# Patient Record
Sex: FEMALE | Race: OTHER | Employment: OTHER | URBAN - METROPOLITAN AREA
[De-identification: names, ages, dates, MRNs, and addresses within clinical notes are randomized per-mention and may not be internally consistent; named-entity substitution may affect disease eponyms.]

---

## 2019-05-14 ENCOUNTER — OFFICE VISIT (OUTPATIENT)
Dept: ORTHOPEDIC SURGERY | Age: 84
End: 2019-05-14
Payer: COMMERCIAL

## 2019-05-14 VITALS — WEIGHT: 229 LBS | SYSTOLIC BLOOD PRESSURE: 103 MMHG | DIASTOLIC BLOOD PRESSURE: 56 MMHG | HEART RATE: 90 BPM

## 2019-05-14 DIAGNOSIS — M25.512 LEFT SHOULDER PAIN, UNSPECIFIED CHRONICITY: ICD-10-CM

## 2019-05-14 DIAGNOSIS — G56.03 CARPAL TUNNEL SYNDROME ON BOTH SIDES: Primary | ICD-10-CM

## 2019-05-14 DIAGNOSIS — M25.511 RIGHT SHOULDER PAIN, UNSPECIFIED CHRONICITY: ICD-10-CM

## 2019-05-14 DIAGNOSIS — M25.531 BILATERAL WRIST PAIN: ICD-10-CM

## 2019-05-14 DIAGNOSIS — M25.532 BILATERAL WRIST PAIN: ICD-10-CM

## 2019-05-14 PROCEDURE — 1090F PRES/ABSN URINE INCON ASSESS: CPT | Performed by: ORTHOPAEDIC SURGERY

## 2019-05-14 PROCEDURE — 1036F TOBACCO NON-USER: CPT | Performed by: ORTHOPAEDIC SURGERY

## 2019-05-14 PROCEDURE — 20605 DRAIN/INJ JOINT/BURSA W/O US: CPT | Performed by: ORTHOPAEDIC SURGERY

## 2019-05-14 PROCEDURE — G8400 PT W/DXA NO RESULTS DOC: HCPCS | Performed by: ORTHOPAEDIC SURGERY

## 2019-05-14 PROCEDURE — 1123F ACP DISCUSS/DSCN MKR DOCD: CPT | Performed by: ORTHOPAEDIC SURGERY

## 2019-05-14 PROCEDURE — 99203 OFFICE O/P NEW LOW 30 MIN: CPT | Performed by: ORTHOPAEDIC SURGERY

## 2019-05-14 PROCEDURE — L3908 WHO COCK-UP NONMOLDE PRE OTS: HCPCS | Performed by: ORTHOPAEDIC SURGERY

## 2019-05-14 PROCEDURE — G8421 BMI NOT CALCULATED: HCPCS | Performed by: ORTHOPAEDIC SURGERY

## 2019-05-14 PROCEDURE — G8427 DOCREV CUR MEDS BY ELIG CLIN: HCPCS | Performed by: ORTHOPAEDIC SURGERY

## 2019-05-14 PROCEDURE — 4040F PNEUMOC VAC/ADMIN/RCVD: CPT | Performed by: ORTHOPAEDIC SURGERY

## 2019-05-14 RX ORDER — GABAPENTIN 100 MG/1
100 CAPSULE ORAL 3 TIMES DAILY
COMMUNITY
End: 2021-05-04 | Stop reason: ALTCHOICE

## 2019-05-14 RX ORDER — ATORVASTATIN CALCIUM 20 MG/1
20 TABLET, FILM COATED ORAL DAILY
COMMUNITY

## 2019-05-14 RX ORDER — MELOXICAM 15 MG/1
15 TABLET ORAL DAILY
COMMUNITY

## 2019-05-14 RX ORDER — LOSARTAN POTASSIUM 25 MG/1
25 TABLET ORAL DAILY
COMMUNITY
End: 2021-05-04 | Stop reason: ALTCHOICE

## 2019-05-14 SDOH — HEALTH STABILITY: MENTAL HEALTH: HOW OFTEN DO YOU HAVE A DRINK CONTAINING ALCOHOL?: NEVER

## 2019-05-14 NOTE — PROGRESS NOTES
Musculoskeletal ROS: positive for - joint pain, joint stiffness and numbness    Depomedrol     NDC#: 7095-1629-89  Lot: [id number]  Expiration Date: 1/2021  Dose: 1cc  Location: injection was given in Left  wrist      Lido    NDC#: 9824-6661-36  Lot: [id number]  Expiration Date: 8/20  Dose:1cc  Location: injection was given in Left wrist

## 2019-05-22 NOTE — PROGRESS NOTES
Chief Complaint    Shoulder Pain (NP right shoulder) and Hand Pain (bilat hand pain)      History of Present Illness:  Jeremy Nava is a 80 y.o. female Very pleasant Amharic only speaking who presents accompanied by her son for further evaluation and treatment  Of bilateral hand numbness and tingling. She is referred by her son whom I know personally. She has not had much intervention for this besides interval splinting. She  Previously saw Dr. Claudia Langley ordered bilateral EMGs. These studies were performed on April 29 and revealed severe bilateral carpal tunnel syndrome, bilaterally. Mild ulnar neuropathy was also noted. She is contemplating surgery but would like to avoid this if at all possible. She also wonders if this is somehow related to any shoulder problems. She is accompanied by her son. Medical History:    Patient's medications, allergies, past medical, surgical, social and family histories were reviewed and updated as appropriate. Past Medical History:   Diagnosis Date    Diabetes ([facility] [address]     Neuropathy     Osteoarthritis       Social History     Socioeconomic History    Marital status:       Spouse name: Not on file    Number of children: Not on file    Years of education: Not on file    Highest education level: Not on file   Occupational History    Not on file   Social Needs    Financial resource strain: Not on file    Food insecurity:     Worry: Not on file     Inability: Not on file    Transportation needs:     Medical: Not on file     Non-medical: Not on file   Tobacco Use    Smoking status: Never Smoker    Smokeless tobacco: Never Used   Substance and Sexual Activity    Alcohol use: Never     Frequency: Never    Drug use: Never    Sexual activity: Not on file   Lifestyle    Physical activity:     Days per week: Not on file     Minutes per session: Not on file    Stress: Not on file   Relationships    Social connections:     Talks on phone: Not on file Gets together: Not on file     Attends Muslim service: Not on file     Active member of club or organization: Not on file     Attends meetings of clubs or organizations: Not on file     Relationship status: Not on file    Intimate partner violence:     Fear of current or ex partner: Not on file     Emotionally abused: Not on file     Physically abused: Not on file     Forced sexual activity: Not on file   Other Topics Concern    Not on file   Social History Narrative    Not on file       No Known Allergies  Current Outpatient Medications on File Prior to Visit   Medication Sig Dispense Refill    metFORMIN (GLUCOPHAGE) 850 MG tablet Take 850 mg by mouth 2 times daily (with meals)      losartan (COZAAR) 25 MG tablet Take 25 mg by mouth daily      meloxicam (MOBIC) 15 MG tablet Take 15 mg by mouth daily      aspirin 81 MG tablet Take 81 mg by mouth daily      atorvastatin (LIPITOR) 20 MG tablet Take 20 mg by mouth daily      gabapentin (NEURONTIN) 100 MG capsule Take 100 mg by mouth 3 times daily.  insulin aspart (NOVOLOG FLEXPEN) 100 UNIT/ML injection pen Inject into the skin 3 times daily (before meals)      ALENDRONATE SODIUM PO Take by mouth       No current facility-administered medications on file prior to visit. Review of Systems:  Pertinent items are noted in HPI  Review of systems reviewed from Patient History Form dated on Today May 14, 2019 and available in the patient's chart under the Media tab. Vital Signs:  Vitals:    05/14/19 1508   BP: (!) 103/56   Pulse: 90       General Exam:   Constitutional: Patient is adequately groomed with no evidence of malnutrition  Mental Status: The patient is oriented to time, place and person. The patient's mood and affect are appropriate. Neurological: The patient has good coordination. There is no weakness or sensory deficit. She appears younger than her stated age and is in no apparent distress.     Bilateral wrist And hand Examination:    Inspection:  No obvious thenar or hyperthenar atrophy, bilaterally. No deformity. Palpation:  Pain with Tinel's at the carpal canal.  Mild positive Tinel at the cubital tunnel bilaterally. No crepitation with gentle finger flexion and extension or wrist motion. Active Range of Motion: Slight deficits in wrist extension and flexion bilaterally. Otherwise preserved range of motion both passively and actively. Passive Range of Motion:  No obvious deficits. Strength:  Hand intrinsic strength 4 over 5. Abduction strength 4 over 5.  strength 4 minus over 5, bilaterally equal.    Special Tests:  Mildly positive median nerve compression test. Negative Finkelstein's test.  No obvious triggering of any of the fingers. Bilateral shoulder examination:    Well-preserved range of motion. No obvious atrophy. No crepitus. Slight tenderness over the rotator cuff. Rotator cuff strength 4 over 5. Deltoid strength 5 over 5. Impingement tests were essentially negative today. Radiology:     Plain radiographs of the Right and left shoulders comprising 3 views each: True AP and internal and external rotation and axillary lateral, were obtained and reviewed in the office today. These are unremarkable except for slight osteopenia and nonspecific a.c. Joint changes. No advanced Glenohumeral osteoarthritis. Assessment :  My impression is that Tracey has bilateral carpal tunnel syndrome. A trial of conservative treatment is most appropriate. Impression:  Encounter Diagnoses   Name Primary?     Right shoulder pain, unspecified chronicity     Bilateral wrist pain     Left shoulder pain, unspecified chronicity     Carpal tunnel syndrome on both sides Yes       Office Procedures:  Orders Placed This Encounter   Procedures    XR SHOULDER RIGHT (MIN 2 VIEWS)     Standing Status:   Future     Number of Occurrences:   1     Standing Expiration Date:   5/14/2020     Order Specific Question: Reason for exam:     Answer:   pain    XR SHOULDER LEFT (MIN 2 VIEWS)     Standing Status:   Future     Number of Occurrences:   1     Standing Expiration Date:   5/14/2020    NM METHYLPREDNISOLONE 40 MG INJ    NM ARTHROCENTESIS ASPIR&/INJ INTERM JT/BURS W/O US    DJO Quick Fit Wrist     Patient was prescribed a DJO Quick Fit Wrist brace. The right wrist will require stabilization / immobilization from this semi-rigid / rigid orthosis to improve their function. The orthosis will assist in protecting the affected area, provide functional support and facilitate healing. The patient was educated and fit by a healthcare professional with expert knowledge and specialization in brace application while under the direct supervision of the treating physician. Verbal and written instructions for the use of and application of this item were provided. They were instructed to contact the office immediately should the brace result in increased pain, decreased sensation, increased swelling or worsening of the condition. Treatment Plan:  We discussed conservative treatment. I offered her corticosteroid injections but she was reluctant at first. Still she eventually agreed to a single injection for the more symptomatic left wrist.      Risks and benefits of a corticosteroid injection were discussed with Nostrat. 40 milligrams of Depo Medrol and 1 CC of 1% lidocaine were injected in the left Wrist following chlorhexidine prep. She  tolerated the procedure well with no immediate adverse sequelae after the injection. We also provided cockup wrist splints to wear bilaterally. We'll see her again for follow-up in a month. If her left wrist is doing well then we can contemplate a similar injection for the right wrist.  She is in agreement with this plan, and all questions were answered today. Chano Novak MD, PhD  5/14/2019

## 2019-06-12 ENCOUNTER — OFFICE VISIT (OUTPATIENT)
Dept: ORTHOPEDIC SURGERY | Age: 84
End: 2019-06-12
Payer: COMMERCIAL

## 2019-06-12 VITALS — WEIGHT: 229 LBS | BODY MASS INDEX: 39.09 KG/M2 | HEIGHT: 64 IN

## 2019-06-12 DIAGNOSIS — G56.03 CARPAL TUNNEL SYNDROME ON BOTH SIDES: ICD-10-CM

## 2019-06-12 DIAGNOSIS — M12.811 ROTATOR CUFF ARTHROPATHY OF RIGHT SHOULDER: Primary | ICD-10-CM

## 2019-06-12 PROCEDURE — 4040F PNEUMOC VAC/ADMIN/RCVD: CPT | Performed by: ORTHOPAEDIC SURGERY

## 2019-06-12 PROCEDURE — 99213 OFFICE O/P EST LOW 20 MIN: CPT | Performed by: ORTHOPAEDIC SURGERY

## 2019-06-12 PROCEDURE — 20610 DRAIN/INJ JOINT/BURSA W/O US: CPT | Performed by: ORTHOPAEDIC SURGERY

## 2019-06-12 PROCEDURE — 1123F ACP DISCUSS/DSCN MKR DOCD: CPT | Performed by: ORTHOPAEDIC SURGERY

## 2019-06-12 PROCEDURE — L3908 WHO COCK-UP NONMOLDE PRE OTS: HCPCS | Performed by: ORTHOPAEDIC SURGERY

## 2019-06-12 PROCEDURE — G8417 CALC BMI ABV UP PARAM F/U: HCPCS | Performed by: ORTHOPAEDIC SURGERY

## 2019-06-12 PROCEDURE — 1036F TOBACCO NON-USER: CPT | Performed by: ORTHOPAEDIC SURGERY

## 2019-06-12 PROCEDURE — G8427 DOCREV CUR MEDS BY ELIG CLIN: HCPCS | Performed by: ORTHOPAEDIC SURGERY

## 2019-06-12 PROCEDURE — G8400 PT W/DXA NO RESULTS DOC: HCPCS | Performed by: ORTHOPAEDIC SURGERY

## 2019-06-12 PROCEDURE — 1090F PRES/ABSN URINE INCON ASSESS: CPT | Performed by: ORTHOPAEDIC SURGERY

## 2019-06-12 NOTE — LETTER
Shoulder Elbow Rehabilitation Referral    Patient Name: Geovanna Guevara      YOB: 1935    Diagnosis:   1. Carpal tunnel syndrome on both sides    2. Rotator cuff arthropathy of right shoulder          Precautions: Corticosteroid injection on 6/12/2019      Post Op Instructions:  [] Continuous passive motion (CPM)  [] Elbow range of motion  [] Exercise in plane of scapula   []  Strengthening     [] Pulley and instruction    [x] Home exercise program (copy to patient)   [] Sling when arm at risk  [] Sling or brace at all times   [x] AAROM: Forward elevation to Full            [x] AAROM: External rotation to Full    [] Isometric external rotator strengthening [x] AAROM: internal rotation: up the back  [x] Isometric abductor strengthening  [x] AAROM: Internal abduction     [] Isometric internal rotator strengthening [x] AAROM: cross-body adduction             Stretching:     Strengthening:  [x] Four quadrant (FE, ER, IR, CBA)  [x] Rotator cuff (ER, IR, Abd)  [x] Forward Elevation    [x] External Rotators     [x] External Rotation    [x] Internal Rotators  [x] Internal Rotation: up/back   [x] Abductors     [x] Internal Rotation: supine in abduction  [x] Flexors  [x] Cross-body abduction    [x] Extensors  [] Pendulum (FE, Abd/Add, cw/ccw)  [x] Scapular Stabilizers   [] Wall-walking (FE, Abd)    [x] Shoulder shrugs     [] Table slides      [x] Rhomboid pinch  [] Elbow (flex, ext, pron, sup)    [] Lat.  Pull downs     [] Medial epicondylitis program    [] Forward punch   [] Lateral epicondylitis program    [] Internal rotators     [] Progressive resistive exercises  [] Bench Press        [] Bench press plus  Activities:     [] Lateral pull-downs  [] Rowing     [x] Progressive two-hand supine press  [] Stepper/Exercise bike   [x] Biceps/tricep: curls/supination  [] Swimming  [] Water exercises    Modalities: PRN    Return to Sport:  [] Ultrasound     [] Plyometrics

## 2019-06-12 NOTE — LETTER
[facility] [address] [facility] [place] [address]  Phone: [phone]  Fax: [phone]    Alisha Frazier MD        June 12, 2019     Patient: Arya Olson   YOB: 1935   Date of Visit: 6/12/2019       To Whom It May Concern: It is my medical opinion that Tracey Mccracken received a cortisone injection in her shoulder and it would be reasonable to consider a repeat injection after 12 weeks. If you have any questions or concerns, please don't hesitate to call.     Sincerely,          Alisha Frazier MD

## 2019-06-16 NOTE — PROGRESS NOTES
History of Present Illness:  Tracey Mccracken Returns for follow-up of her wrists and also for a new right shoulder problem. At her last visit I diagnosed bilateral carpal tunnel syndrome. He gave her a corticosteroid injection for the more symptomatic left wrist.  She reports that the corticosteroid injection did not help. She has not tried bracing. She is accompanied by her daughter-in-law. Meanwhile she also wanted me to examine her right shoulder. She's had chronic right shoulder pain and weakness. She has pain when laying on it. It bothers her with activities of daily living and around the house. Medical History:  Patient's medications, allergies, past medical, surgical, social and family histories were reviewed and updated as appropriate. Pertinent items are noted in HPI  Review of systems reviewed from Patient History Form dated on May 14, 2019 and available in the patient's chart under the Media tab. Vital Signs:  Vitals:     Constitutional: She is a healthy woman who appears younger than her stated age of 80. Right Shoulder Examination:    Inspection:  No deformity or obvious atrophy. Palpation:  Tenderness over the rotator cuff. They tenderness over the a.c. Joint and posterior joint line. Active Range of Motion: Active elevation to 90°. Internal rotation to the back pocket. Passive Range of Motion:  Passive elevation to 140° but with a painful arc. Strength:  Negative Hornblower's. Negative external rotation lag sign. However her strength is no more than 3 out of 5 on rotator cuff strength testing. Deltoid strength 4 out of 5. Special Tests:  Impingement tests are positive. Bilateral wrist examination:    No atrophy of the thenar or hyperthenar eminences. Decreased sensation over the median distribution bilaterally. No sequelae from the injection into the left carpal canal.      Radiology:     Previous x-rays from May 14 Were reviewed.   She has bilateral rotator cuff arthropathy. Assessment :  Right shoulder rotator cuff arthropathy and bilateral carpal tunnel syndrome. Impression:  Encounter Diagnoses   Name Primary?  Carpal tunnel syndrome on both sides     Rotator cuff arthropathy of right shoulder Yes       Office Procedures:  Orders Placed This Encounter   Procedures    NH METHYLPREDNISOLONE 40 MG INJ    NH ARTHROCENTESIS ASPIR&/INJ MAJOR JT/BURSA W/O US    DJO Quick Fit Wrist and Forearm     Patient was prescribed a DJO Quick Fit Wrist and Forearm Brace. The left hand will require stabilization / immobilization from this semi-rigid / rigid orthosis to improve their function. The orthosis will assist in protecting the affected area, provide functional support and facilitate healing. The patient was educated and fit by a healthcare professional with expert knowledge and specialization in brace application while under the direct supervision of the physician. Verbal and written instructions for the use of and application of this item were provided. They were instructed to contact the office immediately should the brace result in increased pain, decreased sensation, increased swelling or worsening of the condition. Treatment Plan:  I offered her a corticosteroid injection into the right Shoulder. Despite her poor response to the injection on the left side I do believe it is worth a try. Her options are otherwise limited. I also wrote a note so that if she returns back to [place] later this summer that she can follow-up with an orthopedic surgeon locally. Finally we will fit her with bilateral cockup wrist splints as he should be helpful. She should wear them primarily at night. Risks and benefits of a corticosteroid injection were discussed with Nostrat. 80 milligrams of Depo Medrol and 8 CC of 1% lidocaine were injected in the right shoulder glenohumeral joint following chlorhexidine prep.  She  tolerated the procedure well with no immediate adverse sequelae after the injection. Chano Bustamante MD, PhD  6/12/2019

## 2021-05-04 ENCOUNTER — OFFICE VISIT (OUTPATIENT)
Dept: ORTHOPEDIC SURGERY | Age: 86
End: 2021-05-04
Payer: COMMERCIAL

## 2021-05-04 VITALS — BODY MASS INDEX: 37.56 KG/M2 | WEIGHT: 220 LBS | HEIGHT: 64 IN

## 2021-05-04 DIAGNOSIS — M12.812 ROTATOR CUFF ARTHROPATHY, LEFT: ICD-10-CM

## 2021-05-04 DIAGNOSIS — M12.811 ROTATOR CUFF ARTHROPATHY, RIGHT: ICD-10-CM

## 2021-05-04 DIAGNOSIS — M25.512 LEFT SHOULDER PAIN, UNSPECIFIED CHRONICITY: ICD-10-CM

## 2021-05-04 DIAGNOSIS — M25.511 RIGHT SHOULDER PAIN, UNSPECIFIED CHRONICITY: Primary | ICD-10-CM

## 2021-05-04 PROCEDURE — 20610 DRAIN/INJ JOINT/BURSA W/O US: CPT | Performed by: ORTHOPAEDIC SURGERY

## 2021-05-04 PROCEDURE — G8428 CUR MEDS NOT DOCUMENT: HCPCS | Performed by: ORTHOPAEDIC SURGERY

## 2021-05-04 PROCEDURE — 1036F TOBACCO NON-USER: CPT | Performed by: ORTHOPAEDIC SURGERY

## 2021-05-04 PROCEDURE — 4040F PNEUMOC VAC/ADMIN/RCVD: CPT | Performed by: ORTHOPAEDIC SURGERY

## 2021-05-04 PROCEDURE — 1123F ACP DISCUSS/DSCN MKR DOCD: CPT | Performed by: ORTHOPAEDIC SURGERY

## 2021-05-04 PROCEDURE — 1090F PRES/ABSN URINE INCON ASSESS: CPT | Performed by: ORTHOPAEDIC SURGERY

## 2021-05-04 PROCEDURE — 99213 OFFICE O/P EST LOW 20 MIN: CPT | Performed by: ORTHOPAEDIC SURGERY

## 2021-05-04 PROCEDURE — G8400 PT W/DXA NO RESULTS DOC: HCPCS | Performed by: ORTHOPAEDIC SURGERY

## 2021-05-04 PROCEDURE — G8417 CALC BMI ABV UP PARAM F/U: HCPCS | Performed by: ORTHOPAEDIC SURGERY

## 2021-05-04 RX ORDER — METHYLPREDNISOLONE ACETATE 40 MG/ML
80 INJECTION, SUSPENSION INTRA-ARTICULAR; INTRALESIONAL; INTRAMUSCULAR; SOFT TISSUE ONCE
Status: COMPLETED | OUTPATIENT
Start: 2021-05-04 | End: 2021-05-04

## 2021-05-04 RX ORDER — LIDOCAINE HYDROCHLORIDE 10 MG/ML
8 INJECTION, SOLUTION INFILTRATION; PERINEURAL ONCE
Status: COMPLETED | OUTPATIENT
Start: 2021-05-04 | End: 2021-05-04

## 2021-05-04 RX ADMIN — METHYLPREDNISOLONE ACETATE 80 MG: 40 INJECTION, SUSPENSION INTRA-ARTICULAR; INTRALESIONAL; INTRAMUSCULAR; SOFT TISSUE at 12:49

## 2021-05-04 RX ADMIN — LIDOCAINE HYDROCHLORIDE 8 ML: 10 INJECTION, SOLUTION INFILTRATION; PERINEURAL at 12:47

## 2021-05-04 NOTE — PROGRESS NOTES
[address] [facility]  Office Visit  New Patient  Date:  2021    Name:  Ayush Beach  Address:  [address] [place] [address]    :  1935      Age:   80 y.o.    SSN:  [id number]      Medical Record Number:  <[id number]>    Chief Complaint:    Bilateral shoulder pain    HPI:   Ayush Beach is a 80 y.o. female who presents for evaluation of bilateral shoulders. We have seen her in the past 2 years ago for rotator cuff arthropathy. We gave her an injection in her right side she states that she had approximately 6 months of very good relief. She does still have limited function. She is here today with a family member who is translating for her. She does state that she does have poor function and is not able to do things like cooking, dressing and hygiene by herself. She is a diabetic and does have an elevated hemoglobin A1c of 8. She is not interesting in any surgical solution for the right shoulder. Pain Assessment  Location of Pain: Shoulder  Location Modifiers: Left, Right  Severity of Pain: 7  Quality of Pain: Dull, Aching  Duration of Pain: Persistent  Frequency of Pain: Intermittent  Aggravating Factors: (general use)  Limiting Behavior: Yes  Relieving Factors: Rest  Work-Related Injury: No  Are there other pain locations you wish to document?: No    Past History:  Past Medical History:   Diagnosis Date    Diabetes ([address]     High blood pressure     Neuropathy     Osteoarthritis        Past Surgical History:   Procedure Laterality Date    JOINT REPLACEMENT Bilateral     TKR    KNEE SURGERY         Social History     Tobacco Use    Smoking status: Never Smoker    Smokeless tobacco: Never Used   Substance Use Topics    Alcohol use: Never     Frequency: Never    Drug use: Never        Family History:  family history is not on file.          Current Outpatient Medications:     meloxicam (MOBIC) 15 MG tablet, Take 15 mg by mouth daily, Disp: , Rfl:     aspirin 81 MG tablet, Take 81 mg by mouth daily, Disp: , Rfl:     atorvastatin (LIPITOR) 20 MG tablet, Take 20 mg by mouth daily, Disp: , Rfl:     insulin aspart (NOVOLOG FLEXPEN) 100 UNIT/ML injection pen, Inject into the skin 3 times daily (before meals), Disp: , Rfl:     ALENDRONATE SODIUM PO, Take by mouth, Disp: , Rfl:       No Known Allergies      Review of Systems: A 14 point review of systems available in the scanned medical record as documented by the patient on 5/4/2021. Today's review pertinent items are noted in HPI. Review of Systems   Cardiovascular:        High blood pressure     Endocrine:        Diabetic     Musculoskeletal:        Joint pain - shoulder   Neurological:        Neuropathy             Physical Exam:  Ht 5' 4\" (1.626 m)   Wt 220 lb (99.8 kg)   BMI 37.76 kg/m²     General: No acute distress, well nourished  CV: No obvious peripheral edema. Normal peripheral pulses  Neuro: Alert & oriented x 3  Psych: Normal mood and affect    Right shoulder exam    No obvious deformities. Forward elevation 120 degrees, external rotation 10 degrees, internal rotation to sacrum. 4/5 strength with abduction, external rotation, belly press. No stability nervous intact distally    Left shoulder exam  No obvious deformities. Forward elevation 120 degrees, external rotation 10 degrees, internal rotation to sacrum. 4/5 strength with abduction, external rotation, belly press. No stability nervous intact distally    Radiographic:  X-rays obtained and reviewed in office, reviewed and interpreted by me today:  Views: 6  Location: Bilateral shoulders  Impression: Radiographs demonstrates decreased acromiohumeral intervals, glenohumeral joint arthritis and bilateral shoulders. Mild high riding humeral head. Radiographs consistent with grade 3 rotator cuff arthropathy. Assessment:  Carroll Mccoy is a 80 y.o. female with:  1.  Rotator cuff arthropathy, bilateral Impression:  Encounter Diagnoses   Name Primary?  Right shoulder pain, unspecified chronicity Yes    Left shoulder pain, unspecified chronicity        Office Procedures:  Orders Placed This Encounter   Procedures    XR SHOULDER RIGHT (MIN 2 VIEWS)     Standing Status:   Future     Number of Occurrences:   1     Standing Expiration Date:   5/4/2022    XR SHOULDER LEFT (MIN 2 VIEWS)     Standing Status:   Future     Number of Occurrences:   1     Standing Expiration Date:   5/4/2022     Order Specific Question:   Reason for exam:     Answer:   pain       Plan:   We discussed the diagnosis treatment options. We did discuss that to improve both pain and function she would be a candidate for reverse total shoulder arthroplasty. She is very against this at this time and does not want to undergo any type of big surgery. She is requesting an injection. We did discuss that due to her diabetes that she may have an elevation of her blood sugars. She expressed understanding of this. Due to her diabetes we prefer to only inject 1 shoulder at this visit and if she does get relief we will have her return in 2 weeks to have her other shoulder injected. We will start with the left today. We did discuss that she needs to check her blood sugars 2-3 times a day for the next 2 days. After discussing the risks and benefits of a corticosteroid injection, Nosrat did state an understanding and gave verbal consent to proceed. After cleansing the injection site with Chlora-prep and using aseptic techniques,  2 CC of Depo Medrol 40mg/ml and 8 CC of 1% lidocaine were injected in the left subacromial space. She  tolerated the procedure well with no immediate adverse sequelae after the injection. A bandage was placed over the injection site. Appropriate post injections instructions were given to the patient.       Beck Amaya DO  [facility] Fellow    The encounter with Carroll Mccoy was supervised by Dr. Jany Branham who personally examined the patient and reviewed the plan. This dictation was performed with a verbal recognition program (DRAGON) and it was checked for errors. It is possible that there are still dictated errors within this office note. If so, please bring any errors to my attention for an addendum. All efforts were made to ensure that this office note is accurate.   _______________  I was physically present and personally supervised the Orthopaedic Sports Medicine Fellow in the evaluation and development of a treatment plan for this patient. I personally interviewed the patient and performed a physical examination. In addition, I discussed the patient's condition and treatment options with them. I have also reviewed and agree with the past medical, family and social history unless otherwise noted. All of the patient's questions were answered. Chano Castro MD, PhD  5/4/2021

## 2021-05-19 ENCOUNTER — OFFICE VISIT (OUTPATIENT)
Dept: ORTHOPEDIC SURGERY | Age: 86
End: 2021-05-19
Payer: COMMERCIAL

## 2021-05-19 VITALS — BODY MASS INDEX: 37.56 KG/M2 | WEIGHT: 220.02 LBS | HEIGHT: 64 IN

## 2021-05-19 DIAGNOSIS — M12.811 ROTATOR CUFF ARTHROPATHY, RIGHT: Primary | ICD-10-CM

## 2021-05-19 DIAGNOSIS — M12.812 ROTATOR CUFF ARTHROPATHY, LEFT: ICD-10-CM

## 2021-05-19 PROCEDURE — G8427 DOCREV CUR MEDS BY ELIG CLIN: HCPCS | Performed by: ORTHOPAEDIC SURGERY

## 2021-05-19 PROCEDURE — 20610 DRAIN/INJ JOINT/BURSA W/O US: CPT | Performed by: ORTHOPAEDIC SURGERY

## 2021-05-19 PROCEDURE — 4040F PNEUMOC VAC/ADMIN/RCVD: CPT | Performed by: ORTHOPAEDIC SURGERY

## 2021-05-19 PROCEDURE — 1090F PRES/ABSN URINE INCON ASSESS: CPT | Performed by: ORTHOPAEDIC SURGERY

## 2021-05-19 PROCEDURE — G8400 PT W/DXA NO RESULTS DOC: HCPCS | Performed by: ORTHOPAEDIC SURGERY

## 2021-05-19 PROCEDURE — G8417 CALC BMI ABV UP PARAM F/U: HCPCS | Performed by: ORTHOPAEDIC SURGERY

## 2021-05-19 PROCEDURE — 1036F TOBACCO NON-USER: CPT | Performed by: ORTHOPAEDIC SURGERY

## 2021-05-19 PROCEDURE — 1123F ACP DISCUSS/DSCN MKR DOCD: CPT | Performed by: ORTHOPAEDIC SURGERY

## 2021-05-19 NOTE — PROGRESS NOTES
Chief Complaint    Follow-up (right shoulder )      History of Present Illness:  Olivier Mccracken is a pleasant, 80 y.o., female, here today for follow up of her bilateral shoulders. She has bilateral cuff arthropathy. She is aware she is heading towards a reverse total shoulder replacement, but she is not ready for an operation at this time. We did administer an intra-articular corticosteroid injection on 5/4/21 for the left shoulder - she is hoping for a right shoulder injection today. Overall she has noticed a significant improvement in symptoms. She feels the injection did provide meaningful relief from symptoms. Her motion has improved as well. She reports no new injuries or setbacks. Pain Assessment  Location of Pain: Shoulder  Location Modifiers: Right  Severity of Pain: 7  Quality of Pain: Aching  Duration of Pain: Persistent  Frequency of Pain: Constant  Aggravating Factors: Other (Comment) (movement)  Relieving Factors: Rest  Result of Injury: No  Work-Related Injury: No  Are there other pain locations you wish to document?: No      Medical History:  Patient's medications, allergies, past medical, surgical, social and family histories were reviewed and updated as appropriate. Review of Systems  A 14 point review of systems was completed by the patient on 5/4/21 and is available in the media section of the scanned medical record and was reviewed on 5/19/2021. The review is negative with the exception of those things mentioned in the HPI and Past Medical History    Vital Signs:  Vitals:       General/Appearance: Alert and oriented and in no apparent distress. Skin:  There are no skin lesions, cellulitis, or extreme edema. The patient has warm and well-perfused Bilateral upper extremities with brisk capillary refill. Right Shoulder Exam:  Inspection: No gross deformities, no signs of infection. Palpation: Joint line tenderness bilaterally    Active Range of Motion:   Forward Elevation 140 on the left vs 120 on the right     Passive Range of Motion: Deferred    Strength: Deferred    Special Tests: No Rod muscle deformity. Neurovascular: Sensation to light touch is intact, no motor deficits, palpable radial pulses 2+      Radiology:     No new XR obtained at this time. Assessment :  Ms. Rafael Perez is a pleasant, 80 y.o. patient with bilateral rotator cuff arthropathy. Impression:  Encounter Diagnoses   Name Primary?  Rotator cuff arthropathy, left Yes    Rotator cuff arthropathy, right        Office Procedures:  Orders Placed This Encounter   Procedures    [place] ARTHROCENTESIS ASPIR&/INJ MAJOR JT/BURSA W/O US     80 mg Depomedrol with 8 cc 1% Lidocaine in 10cc syringe with 25G (22G) needle     After discussing the risks and benefits of a corticosteroid injection, Olivier did state an understanding and gave verbal consent to proceed. After cleansing the injection site with Chlora-prep and using aseptic techniques,  2 CC of Depo Medrol 40mg/ml and 8 CC of 1% lidocaine were injected in the right subacromial space. She  tolerated the procedure well with no immediate adverse sequelae after the injection. A bandage was placed over the injection site. Appropriate post injections instructions were given to the patient. Treatment Plan:  We do feel she will benefit from a right shoulder injection today as the left side did provide significant relief from symptoms. Again, we will have her monitor her blood sugar closely following this injection. Her blood sugars beau to 400 after the most recent injection, but stabilized after a few days. We explained we may continue to utilize injections as a treatment option as long as they provide relief from symptoms. We can see her back prior to her upcoming travel for repeat injections if needed. We will see Olivier back in 3 months and/or as needed.  All questions were answered to patient's satisfaction and She was encouraged to call with any further questions or concerns. Jose Ramon Villanueva is in agreement with this plan. 5/19/2021  4:50 PM    Veena Diane ATC  [facility] [address] [employer]    During this examination, I, Williamsry President, functioned as a scribe for Dr. Lakisha Ellsworth. The history taking and physical examination were performed by Dr. Joan Luna. All counseling during the appointment was performed between the patient and Dr. Joan Luna. 5/19/21   __________________  I, Dr. Lakisha Ellsworth, personally performed the services described in this documentation as described by Veena Diane ATC in my presence, and it is both accurate and complete. Sameaylin Luna MD, PhD  5/19/2021

## 2021-07-07 ENCOUNTER — OFFICE VISIT (OUTPATIENT)
Dept: ORTHOPEDIC SURGERY | Age: 86
End: 2021-07-07
Payer: COMMERCIAL

## 2021-07-07 VITALS — BODY MASS INDEX: 37.56 KG/M2 | HEIGHT: 64 IN | WEIGHT: 220.02 LBS

## 2021-07-07 DIAGNOSIS — M12.812 ROTATOR CUFF ARTHROPATHY, LEFT: Primary | ICD-10-CM

## 2021-07-07 DIAGNOSIS — M25.512 LEFT SHOULDER PAIN, UNSPECIFIED CHRONICITY: ICD-10-CM

## 2021-07-07 DIAGNOSIS — M12.811 ROTATOR CUFF ARTHROPATHY, RIGHT: ICD-10-CM

## 2021-07-07 PROCEDURE — 1123F ACP DISCUSS/DSCN MKR DOCD: CPT | Performed by: ORTHOPAEDIC SURGERY

## 2021-07-07 PROCEDURE — 1090F PRES/ABSN URINE INCON ASSESS: CPT | Performed by: ORTHOPAEDIC SURGERY

## 2021-07-07 PROCEDURE — G8400 PT W/DXA NO RESULTS DOC: HCPCS | Performed by: ORTHOPAEDIC SURGERY

## 2021-07-07 PROCEDURE — 1036F TOBACCO NON-USER: CPT | Performed by: ORTHOPAEDIC SURGERY

## 2021-07-07 PROCEDURE — 20610 DRAIN/INJ JOINT/BURSA W/O US: CPT | Performed by: ORTHOPAEDIC SURGERY

## 2021-07-07 PROCEDURE — 99213 OFFICE O/P EST LOW 20 MIN: CPT | Performed by: ORTHOPAEDIC SURGERY

## 2021-07-07 PROCEDURE — G8427 DOCREV CUR MEDS BY ELIG CLIN: HCPCS | Performed by: ORTHOPAEDIC SURGERY

## 2021-07-07 PROCEDURE — G8417 CALC BMI ABV UP PARAM F/U: HCPCS | Performed by: ORTHOPAEDIC SURGERY

## 2021-07-07 PROCEDURE — 4040F PNEUMOC VAC/ADMIN/RCVD: CPT | Performed by: ORTHOPAEDIC SURGERY

## 2021-07-07 NOTE — PROGRESS NOTES
[facility]  History and Physical  Shoulder Pain    Date:  2021    Name:  Eva Gonzales  Address:  [address] [place] [address]    :  1935      Age:   80 y.o.    SSN:  [id number]      Medical Record Number:  <[id number]>    Reason for Visit:    Follow-up (right shoulder)      HPI:   Eva Gonzales is a 80 y.o. female with a history of type 2 diabetes presents to our office today for follow up of the bilateral shoulder pain. Patient has bilateral shoulder rotator cuff arthropathy. She reports the left side is worse than the right side. She has been receiving injections and overall feels that they are having diminishing returns. She is still hesitant to consider surgery at this time. She ould like to know if she can have repeat steroid injections done today. She denies any new injuries. She is accompanied by her daughter-in-law today. Pain Assessment  Location of Pain: Shoulder  Location Modifiers: Right  Severity of Pain: 4  Quality of Pain: Aching, Dull  Duration of Pain: Persistent  Frequency of Pain: Constant  Aggravating Factors: Other (Comment) (n/a)  Relieving Factors: Rest  Result of Injury: No  Work-Related Injury: No  Are there other pain locations you wish to document?: No    Review of Systems    Done: 21  Patient has had no medical changes since last evaluated          Review of Systems:  A 14 point review of systems available in the scanned medical record as documented by the patient on 2021. The review is negative with the exception of those things mentioned in the History of Present Illness and Past Medical History. Past Medical History:  Patient's medications, allergies, past medical, surgical, social and family histories were reviewed and updated as appropriate.     Allergies:  No Known Allergies    Physical Exam:  Vitals:     General: Eva Gonzales is a healthy and well appearing 80 y.o. female who is sitting comfortably in our office in acute distress. Skin:  There are no skin lesions, cellulitis, or extreme edema. The patient has warm and well-perfused Bilateral upper extremities with brisk capillary refill. Eyes: Extra-ocular muscles intact  Mouth: Oral mucosa moist. No perioral lesions  Pulm: Respirations unlabored and regular. Neuro: Alert and oriented x3    Bilateral shoulder Exam:  Inspection:  No gross deformities, no signs of infection. Palpation: Has tenderness over the rotator cuff footprint and joint line. She has subacromial and glenohumeral crepitus present. Active Range of Motion: Forward elevation of 140, external rotation with elbow at the side 0, internal rotation to the back is back pocket bilaterally    Passive Range of Motion: Deferred    Strength: Deferred    Special Tests:  No Rod muscle deformity. Neurovascular: Sensation to light touch is intact, no motor deficits, palpable radial pulses 2+    Additional Examinations:    Examination of the contralateral extremity does not show any tenderness, deformity or injury. Range of motion is unremarkable. There is no gross instability. There are no rashes, ulcerations or lesions. Strength and tone are normal.      Radiographic:  Old x-rays reviewed today   Assessment:  Mallory Corrigan is a 80 y.o. female with bilateral shoulder pain related to rotator cuff arthropathy. .    Impression:  Encounter Diagnoses   Name Primary?  Rotator cuff arthropathy, left Yes    Rotator cuff arthropathy, right     Left shoulder pain, unspecified chronicity        Office Procedures:  Orders Placed This Encounter   Procedures    [place] ARTHROCENTESIS ASPIR&/INJ MAJOR JT/BURSA W/O US     80 mg Depomedrol with 8 cc 1% Lidocaine in 10cc syringe with 25G (22G) needle         After discussing the risks and benefits of a corticosteroid injection, Nosrat did state an understanding and gave verbal consent to proceed.   After cleansing the injection site with Chlora-prep and using aseptic techniques,  2 CC of Depo Medrol 40mg/ml and 8 CC of 1% lidocaine were injected in the left glenohumeral joint. She  tolerated the procedure well with no immediate adverse sequelae after the injection. A bandage was placed over the injection site. Appropriate post injections instructions were given to the patient. Plan: Today we again discussed the advantages of surgery which would provide definitive treatment for her. This would be a reverse total shoulder arthroplasty. We would start on the left shoulder and if it provides adequate improvements with the patient she can opt to do the right side after that. At this point the patient would like to continue to think about it and would like to hold off on surgical intervention. We went ahead and did a corticosteroid injection on the left shoulder. She was asked to keep a close eye on her blood glucose levels. We can certainly consider the right shoulder injection and 3 weeks. She was also told that we cannot continue to do these injections if they are only providing 8 to 9 weeks of relief. Patient and family member are well aware. All of their questions were fully answered today. 7/7/2021  10:30 AM      Jesse Holder PA-C  Orthopaedic Sports Medicine Physician Assistant    During this examination, I, Jesse Holder PA-C, functioned as a scribe for Dr. Melody Garces. This dictation was performed with a verbal recognition program (DRAGON) and it was checked for errors. It is possible that there are still dictated errors within this office note. If so, please bring any errors to my attention for an addendum. All efforts were made to ensure that this office note is accurate.  _________________  I, Dr. Melody Garces, personally performed the services described in this documentation as described by Jesse Holder PA-C in my presence, and it is both accurate and complete. Chano Arora MD, PhD  7/7/2021